# Patient Record
Sex: FEMALE | Race: WHITE | NOT HISPANIC OR LATINO | Employment: UNEMPLOYED | URBAN - METROPOLITAN AREA
[De-identification: names, ages, dates, MRNs, and addresses within clinical notes are randomized per-mention and may not be internally consistent; named-entity substitution may affect disease eponyms.]

---
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
## 2019-04-11 ENCOUNTER — NEW PATIENT (OUTPATIENT)
Dept: URBAN - METROPOLITAN AREA CLINIC 10 | Facility: CLINIC | Age: 19
End: 2019-04-11
Payer: MEDICARE

## 2019-04-11 DIAGNOSIS — H04.123 TEAR FILM INSUFFICIENCY OF BILATERAL LACRIMAL GLANDS: Primary | ICD-10-CM

## 2019-04-11 DIAGNOSIS — H52.13 MYOPIA, BILATERAL: ICD-10-CM

## 2019-04-11 PROCEDURE — 92004 COMPRE OPH EXAM NEW PT 1/>: CPT | Performed by: OPTOMETRIST

## 2019-04-11 ASSESSMENT — VISUAL ACUITY
OD: 20/20
OS: 20/20

## 2019-04-11 ASSESSMENT — KERATOMETRY: OD: 46.00

## 2019-04-11 ASSESSMENT — INTRAOCULAR PRESSURE
OS: 13
OD: 15

## 2022-05-24 ENCOUNTER — OFFICE VISIT (OUTPATIENT)
Dept: URBAN - METROPOLITAN AREA CLINIC 30 | Facility: CLINIC | Age: 22
End: 2022-05-24
Payer: COMMERCIAL

## 2022-05-24 DIAGNOSIS — H52.13 MYOPIA, BILATERAL: Primary | ICD-10-CM

## 2022-05-24 DIAGNOSIS — H04.123 TEAR FILM INSUFFICIENCY OF BILATERAL LACRIMAL GLANDS: ICD-10-CM

## 2022-05-24 DIAGNOSIS — H52.4 PRESBYOPIA: ICD-10-CM

## 2022-05-24 DIAGNOSIS — H43.393 OTHER VITREOUS OPACITIES, BILATERAL: ICD-10-CM

## 2022-05-24 PROCEDURE — 92014 COMPRE OPH EXAM EST PT 1/>: CPT | Performed by: OPTOMETRIST

## 2022-05-24 PROCEDURE — 92134 CPTRZ OPH DX IMG PST SGM RTA: CPT | Performed by: OPTOMETRIST

## 2022-05-24 ASSESSMENT — VISUAL ACUITY
OD: 20/20
OS: 20/20

## 2022-05-24 ASSESSMENT — INTRAOCULAR PRESSURE
OS: 10
OD: 12

## 2022-05-24 ASSESSMENT — KERATOMETRY
OS: 46.37
OD: 46.27

## 2022-05-24 NOTE — IMPRESSION/PLAN
Impression: Other vitreous opacities, bilateral: H43.393. Plan: No evidence of RD or tear noted. All signs and risks of retinal detachment or tears were discussed in detail. If pt. notices any symptoms discussed, contact office ASAP. Recommend pt. return for normal recall. See MAC OCT.

## 2022-05-24 NOTE — IMPRESSION/PLAN
Impression: Tear film insufficiency of bilateral lacrimal glands Plan: Continue AT's qid OU. Recommend O3's. Avoid ceiling fans.

## 2022-05-24 NOTE — IMPRESSION/PLAN
Impression: Myopia, bilateral Plan: Generated new srx. Previously discussed other options would be contact lens fitting or LASIK.
hx[[Pt to speak with [facility] regarding cost of LASIK. 
Cards given for CL fitting outside [facility].]]

## (undated) DEVICE — MMIS - BASIN SURG PT SPC SET UP OSHKOSH

## (undated) DEVICE — MMIS - APPLICATOR 70% ISO ALC 2% CHG 26ML 13.2X13.2IN

## (undated) DEVICE — MMIS - TROCAR LAPSCP 100MM 12MM KII BLN BLUNT TIP LF OPTC

## (undated) DEVICE — MMIS - GLOVE SURG 8 DRMPRN ULTRA LF DARK GRN CHMO PF SMTH

## (undated) DEVICE — MMIS - BAG SPEC RTRVL 6X4IN 10MM EPCH LG PLASTIC INTRO

## (undated) DEVICE — MMIS - APPLIER INT CLP LG TI ANG JAW DIST TIP CLOSE

## (undated) DEVICE — MMIS - TROCAR LAPSCP 100MM 5MM EPTH XCEL STAB SLV RDLCNT

## (undated) DEVICE — MMIS - PEN SURGMRK STD TIP FLXB RULER LBL PREP RST

## (undated) DEVICE — MMIS - SCALPEL SURG 15 CRTDG SFGRD PROTECTIVE SHTH STRL

## (undated) DEVICE — MMIS - DRAPE EQUIPMENT 66X44IN 2 DISC FLUID WARM ORS

## (undated) DEVICE — MMIS - SYRINGE 10ML STRL MED LF DISP LL

## (undated) DEVICE — MMIS - TROCAR ENDO 12MM 100MM EPTH XCEL BLDLS OPTC TIP

## (undated) DEVICE — MMIS - TIP SCSR 2 ACT 5 MUELLER METZ CRV DISP 5MM

## (undated) DEVICE — MMIS - SUTURE VCL+ MTPS 4-0 PS2 27IN BRAID COAT ABS

## (undated) DEVICE — MMIS - SYRINGE 30ML 1ML CONC TIP 1ML GRAD N-PYRG DEHP-FR

## (undated) DEVICE — MMIS - SUTURE VICRYL 0 CT2 27IN BRAID COAT ABS UNDYED

## (undated) DEVICE — MMIS - SOLUTION AQLT 0.9% NACL 1000ML LF IRR

## (undated) DEVICE — MMIS - COVER SRG CNTRL STRL LF LIGHT HNDL HARMONYAIR M

## (undated) DEVICE — MMIS - BLANKET WRM UPR BODY PED SM 30X80IN SNGWRM PLMR

## (undated) DEVICE — MMIS - TUBING INSFL 10FT LL HFL HEATED PT KIT 40L STRL

## (undated) DEVICE — MMIS - ELECTRODE PT RTN C30- LB 9FT CORD NONIRRITATE

## (undated) DEVICE — MMIS - NEEDLE HPO 25GA 1.5IN REG WALL REG BVL LL HUB

## (undated) DEVICE — MMIS - SOLUTION ANTIFOG 6ML KIT FGOUT STRL LF DISP

## (undated) DEVICE — MMIS - GOWN SURG XL XLONG AAMI L4 IMPRV RAGLAN SLV BRTHBL

## (undated) DEVICE — MMIS - PACK SURG GN ESCP DRAPE REINFORCE TBL CVR ABS TWL

## (undated) DEVICE — MMIS - SHEET PATRAN TRNSF

## (undated) DEVICE — MMIS - GRASPER LAPSCP AUTOMATIC HNDL PAD 35CM 5MM D DR